# Patient Record
Sex: MALE | Race: OTHER | NOT HISPANIC OR LATINO | URBAN - METROPOLITAN AREA
[De-identification: names, ages, dates, MRNs, and addresses within clinical notes are randomized per-mention and may not be internally consistent; named-entity substitution may affect disease eponyms.]

---

## 2022-01-12 VITALS
TEMPERATURE: 98 F | DIASTOLIC BLOOD PRESSURE: 88 MMHG | SYSTOLIC BLOOD PRESSURE: 134 MMHG | RESPIRATION RATE: 18 BRPM | OXYGEN SATURATION: 98 % | HEART RATE: 98 BPM

## 2022-01-12 RX ORDER — EVOLOCUMAB 140 MG/ML
1 INJECTION, SOLUTION SUBCUTANEOUS
Qty: 0 | Refills: 0 | DISCHARGE

## 2022-01-12 RX ORDER — METOPROLOL TARTRATE 50 MG
1 TABLET ORAL
Qty: 0 | Refills: 0 | DISCHARGE

## 2022-01-12 RX ORDER — PANTOPRAZOLE SODIUM 20 MG/1
1 TABLET, DELAYED RELEASE ORAL
Qty: 0 | Refills: 0 | DISCHARGE

## 2022-01-12 RX ORDER — CHLORHEXIDINE GLUCONATE 213 G/1000ML
1 SOLUTION TOPICAL ONCE
Refills: 0 | Status: DISCONTINUED | OUTPATIENT
Start: 2022-01-14 | End: 2022-01-15

## 2022-01-12 NOTE — H&P ADULT - HISTORY OF PRESENT ILLNESS
[place]   COVID:  Cardiologist: Dr Hampton  Pharmacy:   Escort:  61 Y M smoker with PMH PAD s/p multiple PTAs (Xarelto 2.5 mg last dose______), CAD with SHANNEN X2 LAD 5 yrs ago, who presented to his cardiologist c/o CP with ____ flights/blocks x ________.  Pt denies SOB, orthopnea, PND, LE edema, N/V/D, dizziness, syncope, fever, chills, melena, hematuria, sick contact or recent travel.   ECHO 10/1/21: EF 68%, mild-mod TR.     In light of risk factors, CCS angina class ___ symptoms and abnormal ____ pt presents for cardiac angiogram with possible intervention if clinically indicated.    COVID: 1/12 @ [facility]  Cardiologist: Dr Hampton  Pharmacy:  [facility] (pt to bring bottles)   Escort: wife   61 Y M smoker and daily ETOH user (1 L wine) with PMH COPD (no home O2), GERD, PAD s/p multiple PTAs (Xarelto 2.5 mg last dose 1/12/22), HTN, HLD, CAD with SHANNEN X2 LAD 5 yrs ago ([facility]), who presented to his cardiologist c/o intermittent 7/10 sscp with associated nausea and diaphoresis at rest x few months. Pt denies SOB, orthopnea, PND, LE edema, V/D, dizziness, syncope, fever, chills, melena, hematuria, sick contact or recent travel.   ECHO 10/1/21: EF 68%, mild-mod TR.  In light of risk factors, CCS angina class IV symptoms  pt presents for cardiac angiogram with possible intervention if clinically indicated.    COVID: 1/12 @ [facility]  Cardiologist: Dr Hampton  Pharmacy:  [facility] (pt to bring bottles)   Escort: wife   62 y/o Male active heavy smoker (3ppd x40yrs) and ETOH abuser (1.5L wine daily, last drink yesterday, denies withdrawal sx) with PMHx COPD (no home O2), GERD, PAD s/p multiple PTAs (Xarelto 2.5 mg last dose 1/12/22), HTN, HLD, CAD with SHANNEN x2 LAD 5 yrs ago ([facility]), who presented to Cardiologist Dr. Eid c/o intermittent 7/10 sscp with associated nausea and diaphoresis at rest x few months. Pt denies SOB, orthopnea, PND, LE edema, V/D, dizziness, syncope, fever, chills, melena, hematuria, sick contact or recent travel. ECHO 10/1/21: EF 68%, mild-mod TR.   In light of risk factors, CCS angina class IV symptoms  pt presents for cardiac angiogram with possible intervention if clinically indicated.

## 2022-01-12 NOTE — H&P ADULT - BP NONINVASIVE MEAN (MM HG)
Patient extubated per Dr. Cortes orders. SHAYLEE Dick and Dr. Cortes at bedside. Patient extubated to 6L Nasal cannula. Patient encouraged to keep coughing up secretions, and has yankeur in hand to handle secretions.    103

## 2022-01-12 NOTE — H&P ADULT - ASSESSMENT
62 y/o Male active heavy smoker (3ppd x40yrs) and ETOH abuser (1.5L wine daily, last drink yesterday, denies withdrawal sx) with PMHx COPD (no home O2), GERD, PAD s/p multiple PTAs (Xarelto 2.5 mg last dose 1/12/22), HTN, HLD, CAD with SHANNEN x2 LAD 5 yrs ago ([facility]), who in light of risk factors, CCS angina class IV symptoms  pt presents for cardiac angiogram with possible intervention if clinically indicated.    EKG: NSR @88bpm w/ TWI in leads II and aVF.  ASA 3			Mallampati class: 2         Anginal Class: 4    -No Known Allergies  -H/H = 16.4/46.9. Pt denies BRBPR, hematuria, hematochezia, melena. Pt reports good compliance w/ home ASA 81mg QD, stating his last dose was 1/11/22. Pt loaded w/ ASA 324mg x1 and Plavix 600mg x1.  -BUN/Cr = 5/0.63. EF 68%. Euvolemic on exam. IV NS @ 180cc/hr x 2hrs started pre procedure    -CIWA protocol ordered    Sedation Plan:   Moderate  Patient Is Suitable Candidate For Sedation?     Yes    Risks & benefits of procedure and alternative therapy have been explained to the patient including but not limited to: allergic reaction, bleeding with possible need for blood transfusion, infection, renal and vascular compromise, limb damage, arrhythmia, stroke, vessel dissection/perforation, myocardial infarction, and emergent CABG. Informed consent obtained at bedside and included in chart. 60 y/o Male active heavy smoker (3ppd x40yrs) and ETOH abuser (1.5L wine daily, last drink yesterday, denies withdrawal sx) with PMHx COPD (no home O2), GERD, PAD s/p multiple PTAs (Xarelto 2.5 mg last dose 1/12/22), HTN, HLD, CAD with SHANNEN x2 LAD 5 yrs ago ([facility]), who in light of risk factors, CCS angina class IV symptoms  pt presents for cardiac angiogram with possible intervention if clinically indicated.    EKG: NSR @88bpm w/ TWI in leads II and aVF.  ASA 3			Mallampati class: 2         Anginal Class: 4    -No Known Allergies  -H/H = 16.4/46.9. Pt denies BRBPR, hematuria, hematochezia, melena. Pt reports good compliance w/ home ASA 81mg QD, stating his last dose was 1/11/22. Pt's last dose of Xarelto 2.5mg was also 1/11/22. Pt loaded w/ ASA 324mg x1 and Plavix 600mg x1.  -BUN/Cr = 5/0.63. EF 68%. Euvolemic on exam. IV NS @ 180cc/hr x 2hrs started pre procedure    -CIWA protocol ordered    Sedation Plan:   Moderate  Patient Is Suitable Candidate For Sedation?     Yes    Risks & benefits of procedure and alternative therapy have been explained to the patient including but not limited to: allergic reaction, bleeding with possible need for blood transfusion, infection, renal and vascular compromise, limb damage, arrhythmia, stroke, vessel dissection/perforation, myocardial infarction, and emergent CABG. Informed consent obtained at bedside and included in chart. 60 y/o Male active heavy smoker (3ppd x40yrs) and ETOH abuser (1.5L wine daily, last drink yesterday, denies withdrawal sx) with PMHx COPD (no home O2), GERD, PAD s/p multiple PTAs (Xarelto 2.5 mg last dose 1/12/22), HTN, HLD, CAD with SHANNEN x2 LAD 5 yrs ago ([facility]), who in light of risk factors, CCS angina class IV symptoms  pt presents for cardiac angiogram with possible intervention if clinically indicated.    EKG: NSR @88bpm w/ TWI in leads II and aVF.  ASA 3			Mallampati class: 2         Anginal Class: 4    -No Known Allergies  -H/H = 16.4/46.9. Pt denies BRBPR, hematuria, hematochezia, melena. Pt reports good compliance w/ home ASA 81mg QD, stating his last dose was 1/11/22. Pt's last dose of Xarelto 2.5mg was also 1/11/22. Pt loaded w/ ASA 324mg x1 and Plavix 600mg x1.  -BUN/Cr = 5/0.63. EF 68%. Euvolemic on exam. IV NS @ 180cc/hr x 2hrs started pre procedure.    -[facility] protocol ordered    Sedation Plan:   Moderate  Patient Is Suitable Candidate For Sedation?     Yes    Risks & benefits of procedure and alternative therapy have been explained to the patient including but not limited to: allergic reaction, bleeding with possible need for blood transfusion, infection, renal and vascular compromise, limb damage, arrhythmia, stroke, vessel dissection/perforation, myocardial infarction, and emergent CABG. Informed consent obtained at bedside and included in chart.

## 2022-01-12 NOTE — H&P ADULT - NSICDXFAMILYHX_GEN_ALL_CORE_FT
FAMILY HISTORY:  Father  Still living? Unknown  FH: HTN (hypertension), Age at diagnosis: Age Unknown  FH: hyperlipidemia, Age at diagnosis: Age Unknown    Mother  Still living? Unknown  Family history of early CAD, Age at diagnosis: Age Unknown  FH: HTN (hypertension), Age at diagnosis: Age Unknown  FH: hyperlipidemia, Age at diagnosis: Age Unknown  FH: type 2 diabetes mellitus, Age at diagnosis: Age Unknown

## 2022-01-12 NOTE — H&P ADULT - NSHPLABSRESULTS_GEN_ALL_CORE
16.4   7.06  )-----------( 321      ( 14 Jan 2022 12:52 )             46.9   01-14    140  |  103  |  5<L>  ----------------------------<  89  4.2   |  25  |  0.63    Ca    9.8      14 Jan 2022 12:52    TPro  6.8  /  Alb  4.0  /  TBili  0.3  /  DBili  x   /  AST  30  /  ALT  14  /  AlkPhos  87  01-14    PT/INR - ( 14 Jan 2022 12:52 )   PT: 10.4 sec;   INR: 0.86          PTT - ( 14 Jan 2022 12:52 )  PTT:34.0 sec

## 2022-01-12 NOTE — H&P ADULT - NSHPSOCIALHISTORY_GEN_ALL_CORE
Tobacco: ___ ppd x _____ yrs  ETOH:   Drug use: Tobacco: 2 ppd x 40 yrs  ETOH: 1 L wine/night  Drug use: denies Tobacco: 2-3 ppd x 40 yrs (120 pack year hx)  ETOH: 1.5 L wine/night  Drug use: denies

## 2022-01-14 ENCOUNTER — INPATIENT (INPATIENT)
Facility: HOSPITAL | Age: 62
LOS: 0 days | Discharge: HOME | DRG: 249 | End: 2022-01-15
Attending: INTERNAL MEDICINE | Admitting: INTERNAL MEDICINE
Payer: COMMERCIAL

## 2022-01-14 DIAGNOSIS — Z98.49 CATARACT EXTRACTION STATUS, UNSPECIFIED EYE: Chronic | ICD-10-CM

## 2022-01-14 LAB
A1C WITH ESTIMATED AVERAGE GLUCOSE RESULT: 4.7 % — SIGNIFICANT CHANGE UP (ref 4–5.6)
ALBUMIN SERPL ELPH-MCNC: 4 G/DL — SIGNIFICANT CHANGE UP (ref 3.3–5)
ALP SERPL-CCNC: 87 U/L — SIGNIFICANT CHANGE UP (ref 40–120)
ALT FLD-CCNC: 14 U/L — SIGNIFICANT CHANGE UP (ref 10–45)
ANION GAP SERPL CALC-SCNC: 12 MMOL/L — SIGNIFICANT CHANGE UP (ref 5–17)
APTT BLD: 34 SEC — SIGNIFICANT CHANGE UP (ref 27.5–35.5)
AST SERPL-CCNC: 30 U/L — SIGNIFICANT CHANGE UP (ref 10–40)
BASOPHILS # BLD AUTO: 0.06 K/UL — SIGNIFICANT CHANGE UP (ref 0–0.2)
BASOPHILS NFR BLD AUTO: 0.8 % — SIGNIFICANT CHANGE UP (ref 0–2)
BILIRUB SERPL-MCNC: 0.3 MG/DL — SIGNIFICANT CHANGE UP (ref 0.2–1.2)
BUN SERPL-MCNC: 5 MG/DL — LOW (ref 7–23)
CALCIUM SERPL-MCNC: 9.8 MG/DL — SIGNIFICANT CHANGE UP (ref 8.4–10.5)
CHLORIDE SERPL-SCNC: 103 MMOL/L — SIGNIFICANT CHANGE UP (ref 96–108)
CHOLEST SERPL-MCNC: 116 MG/DL — SIGNIFICANT CHANGE UP
CK MB CFR SERPL CALC: 1.7 NG/ML — SIGNIFICANT CHANGE UP (ref 0–6.7)
CK SERPL-CCNC: 32 U/L — SIGNIFICANT CHANGE UP (ref 30–200)
CO2 SERPL-SCNC: 25 MMOL/L — SIGNIFICANT CHANGE UP (ref 22–31)
CREAT SERPL-MCNC: 0.63 MG/DL — SIGNIFICANT CHANGE UP (ref 0.5–1.3)
EOSINOPHIL # BLD AUTO: 0.21 K/UL — SIGNIFICANT CHANGE UP (ref 0–0.5)
EOSINOPHIL NFR BLD AUTO: 3 % — SIGNIFICANT CHANGE UP (ref 0–6)
ESTIMATED AVERAGE GLUCOSE: 88 MG/DL — SIGNIFICANT CHANGE UP (ref 68–114)
GLUCOSE BLDC GLUCOMTR-MCNC: 174 MG/DL — HIGH (ref 70–99)
GLUCOSE BLDC GLUCOMTR-MCNC: 85 MG/DL — SIGNIFICANT CHANGE UP (ref 70–99)
GLUCOSE SERPL-MCNC: 89 MG/DL — SIGNIFICANT CHANGE UP (ref 70–99)
HCT VFR BLD CALC: 46.9 % — SIGNIFICANT CHANGE UP (ref 39–50)
HDLC SERPL-MCNC: 43 MG/DL — SIGNIFICANT CHANGE UP
HGB BLD-MCNC: 16.4 G/DL — SIGNIFICANT CHANGE UP (ref 13–17)
IMM GRANULOCYTES NFR BLD AUTO: 0.8 % — SIGNIFICANT CHANGE UP (ref 0–1.5)
INR BLD: 0.86 — LOW (ref 0.88–1.16)
LIPID PNL WITH DIRECT LDL SERPL: 31 MG/DL — SIGNIFICANT CHANGE UP
LYMPHOCYTES # BLD AUTO: 2.28 K/UL — SIGNIFICANT CHANGE UP (ref 1–3.3)
LYMPHOCYTES # BLD AUTO: 32.3 % — SIGNIFICANT CHANGE UP (ref 13–44)
MCHC RBC-ENTMCNC: 35 GM/DL — SIGNIFICANT CHANGE UP (ref 32–36)
MCHC RBC-ENTMCNC: 35.7 PG — HIGH (ref 27–34)
MCV RBC AUTO: 102.2 FL — HIGH (ref 80–100)
MONOCYTES # BLD AUTO: 0.82 K/UL — SIGNIFICANT CHANGE UP (ref 0–0.9)
MONOCYTES NFR BLD AUTO: 11.6 % — SIGNIFICANT CHANGE UP (ref 2–14)
NEUTROPHILS # BLD AUTO: 3.63 K/UL — SIGNIFICANT CHANGE UP (ref 1.8–7.4)
NEUTROPHILS NFR BLD AUTO: 51.5 % — SIGNIFICANT CHANGE UP (ref 43–77)
NON HDL CHOLESTEROL: 73 MG/DL — SIGNIFICANT CHANGE UP
NRBC # BLD: 0 /100 WBCS — SIGNIFICANT CHANGE UP (ref 0–0)
PLATELET # BLD AUTO: 321 K/UL — SIGNIFICANT CHANGE UP (ref 150–400)
POTASSIUM SERPL-MCNC: 4.2 MMOL/L — SIGNIFICANT CHANGE UP (ref 3.5–5.3)
POTASSIUM SERPL-SCNC: 4.2 MMOL/L — SIGNIFICANT CHANGE UP (ref 3.5–5.3)
PROT SERPL-MCNC: 6.8 G/DL — SIGNIFICANT CHANGE UP (ref 6–8.3)
PROTHROM AB SERPL-ACNC: 10.4 SEC — LOW (ref 10.6–13.6)
RBC # BLD: 4.59 M/UL — SIGNIFICANT CHANGE UP (ref 4.2–5.8)
RBC # FLD: 13.2 % — SIGNIFICANT CHANGE UP (ref 10.3–14.5)
SODIUM SERPL-SCNC: 140 MMOL/L — SIGNIFICANT CHANGE UP (ref 135–145)
TRIGL SERPL-MCNC: 210 MG/DL — HIGH
WBC # BLD: 7.06 K/UL — SIGNIFICANT CHANGE UP (ref 3.8–10.5)
WBC # FLD AUTO: 7.06 K/UL — SIGNIFICANT CHANGE UP (ref 3.8–10.5)

## 2022-01-14 RX ORDER — SODIUM CHLORIDE 9 MG/ML
500 INJECTION INTRAMUSCULAR; INTRAVENOUS; SUBCUTANEOUS
Refills: 0 | Status: DISCONTINUED | OUTPATIENT
Start: 2022-01-14 | End: 2022-01-15

## 2022-01-14 RX ORDER — ASPIRIN/CALCIUM CARB/MAGNESIUM 324 MG
81 TABLET ORAL DAILY
Refills: 0 | Status: DISCONTINUED | OUTPATIENT
Start: 2022-01-15 | End: 2022-01-15

## 2022-01-14 RX ORDER — DEXTROSE 50 % IN WATER 50 %
25 SYRINGE (ML) INTRAVENOUS ONCE
Refills: 0 | Status: DISCONTINUED | OUTPATIENT
Start: 2022-01-14 | End: 2022-01-15

## 2022-01-14 RX ORDER — SODIUM CHLORIDE 9 MG/ML
1000 INJECTION, SOLUTION INTRAVENOUS
Refills: 0 | Status: DISCONTINUED | OUTPATIENT
Start: 2022-01-14 | End: 2022-01-14

## 2022-01-14 RX ORDER — DEXTROSE 50 % IN WATER 50 %
12.5 SYRINGE (ML) INTRAVENOUS ONCE
Refills: 0 | Status: DISCONTINUED | OUTPATIENT
Start: 2022-01-14 | End: 2022-01-15

## 2022-01-14 RX ORDER — PANTOPRAZOLE SODIUM 20 MG/1
40 TABLET, DELAYED RELEASE ORAL DAILY
Refills: 0 | Status: DISCONTINUED | OUTPATIENT
Start: 2022-01-14 | End: 2022-01-15

## 2022-01-14 RX ORDER — RIVAROXABAN 15 MG-20MG
2.5 KIT ORAL
Refills: 0 | Status: DISCONTINUED | OUTPATIENT
Start: 2022-01-15 | End: 2022-01-15

## 2022-01-14 RX ORDER — DEXTROSE 50 % IN WATER 50 %
15 SYRINGE (ML) INTRAVENOUS ONCE
Refills: 0 | Status: DISCONTINUED | OUTPATIENT
Start: 2022-01-14 | End: 2022-01-15

## 2022-01-14 RX ORDER — INSULIN LISPRO 100/ML
VIAL (ML) SUBCUTANEOUS ONCE
Refills: 0 | Status: DISCONTINUED | OUTPATIENT
Start: 2022-01-14 | End: 2022-01-14

## 2022-01-14 RX ORDER — DEXTROSE 50 % IN WATER 50 %
25 SYRINGE (ML) INTRAVENOUS ONCE
Refills: 0 | Status: DISCONTINUED | OUTPATIENT
Start: 2022-01-14 | End: 2022-01-14

## 2022-01-14 RX ORDER — CLOPIDOGREL BISULFATE 75 MG/1
75 TABLET, FILM COATED ORAL DAILY
Refills: 0 | Status: DISCONTINUED | OUTPATIENT
Start: 2022-01-15 | End: 2022-01-15

## 2022-01-14 RX ORDER — ASPIRIN/CALCIUM CARB/MAGNESIUM 324 MG
325 TABLET ORAL ONCE
Refills: 0 | Status: COMPLETED | OUTPATIENT
Start: 2022-01-14 | End: 2022-01-14

## 2022-01-14 RX ORDER — DEXTROSE 50 % IN WATER 50 %
12.5 SYRINGE (ML) INTRAVENOUS ONCE
Refills: 0 | Status: DISCONTINUED | OUTPATIENT
Start: 2022-01-14 | End: 2022-01-14

## 2022-01-14 RX ORDER — SODIUM CHLORIDE 9 MG/ML
1000 INJECTION, SOLUTION INTRAVENOUS
Refills: 0 | Status: DISCONTINUED | OUTPATIENT
Start: 2022-01-14 | End: 2022-01-15

## 2022-01-14 RX ORDER — CLOPIDOGREL BISULFATE 75 MG/1
600 TABLET, FILM COATED ORAL ONCE
Refills: 0 | Status: COMPLETED | OUTPATIENT
Start: 2022-01-14 | End: 2022-01-14

## 2022-01-14 RX ORDER — METOPROLOL TARTRATE 50 MG
25 TABLET ORAL DAILY
Refills: 0 | Status: DISCONTINUED | OUTPATIENT
Start: 2022-01-15 | End: 2022-01-15

## 2022-01-14 RX ORDER — SODIUM CHLORIDE 9 MG/ML
500 INJECTION INTRAMUSCULAR; INTRAVENOUS; SUBCUTANEOUS
Refills: 0 | Status: DISCONTINUED | OUTPATIENT
Start: 2022-01-14 | End: 2022-01-14

## 2022-01-14 RX ORDER — GLUCAGON INJECTION, SOLUTION 0.5 MG/.1ML
1 INJECTION, SOLUTION SUBCUTANEOUS ONCE
Refills: 0 | Status: DISCONTINUED | OUTPATIENT
Start: 2022-01-14 | End: 2022-01-14

## 2022-01-14 RX ORDER — DEXTROSE 50 % IN WATER 50 %
15 SYRINGE (ML) INTRAVENOUS ONCE
Refills: 0 | Status: DISCONTINUED | OUTPATIENT
Start: 2022-01-14 | End: 2022-01-14

## 2022-01-14 RX ORDER — GLUCAGON INJECTION, SOLUTION 0.5 MG/.1ML
1 INJECTION, SOLUTION SUBCUTANEOUS ONCE
Refills: 0 | Status: DISCONTINUED | OUTPATIENT
Start: 2022-01-14 | End: 2022-01-15

## 2022-01-14 RX ORDER — INSULIN LISPRO 100/ML
VIAL (ML) SUBCUTANEOUS
Refills: 0 | Status: DISCONTINUED | OUTPATIENT
Start: 2022-01-14 | End: 2022-01-15

## 2022-01-14 RX ORDER — INFLUENZA VIRUS VACCINE 15; 15; 15; 15 UG/.5ML; UG/.5ML; UG/.5ML; UG/.5ML
0.5 SUSPENSION INTRAMUSCULAR ONCE
Refills: 0 | Status: DISCONTINUED | OUTPATIENT
Start: 2022-01-14 | End: 2022-01-15

## 2022-01-14 RX ADMIN — SODIUM CHLORIDE 180 MILLILITER(S): 9 INJECTION INTRAMUSCULAR; INTRAVENOUS; SUBCUTANEOUS at 19:27

## 2022-01-14 RX ADMIN — Medication 2: at 22:32

## 2022-01-14 RX ADMIN — CLOPIDOGREL BISULFATE 600 MILLIGRAM(S): 75 TABLET, FILM COATED ORAL at 14:34

## 2022-01-14 RX ADMIN — Medication 325 MILLIGRAM(S): at 14:34

## 2022-01-14 RX ADMIN — SODIUM CHLORIDE 180 MILLILITER(S): 9 INJECTION INTRAMUSCULAR; INTRAVENOUS; SUBCUTANEOUS at 14:34

## 2022-01-14 NOTE — PATIENT PROFILE ADULT - FALL HARM RISK - HARM RISK INTERVENTIONS

## 2022-01-14 NOTE — PROGRESS NOTE ADULT - SUBJECTIVE AND OBJECTIVE BOX
Interventional Cardiology Radial band Removal Note    Pt without complaints.  VSS.    Right Radial access site TR Hemoband in place, no hematoma, no bleed  Radial pulse: 2+    Hemostasis achieved with manual release of hemoband.    No Vasovagal reaction.    Meds given: None    Right Radial access site  no hematoma, no bleed  Radial pulse: 2+    A/P:  Stent  -	continue to monitor  -	-OOB as tolerated  -	Post Procedure Instructions given  -                   Call [phone] if any access site issues.

## 2022-01-15 VITALS — TEMPERATURE: 98 F

## 2022-01-15 LAB
ANION GAP SERPL CALC-SCNC: 9 MMOL/L — SIGNIFICANT CHANGE UP (ref 5–17)
BUN SERPL-MCNC: 6 MG/DL — LOW (ref 7–23)
CALCIUM SERPL-MCNC: 8.8 MG/DL — SIGNIFICANT CHANGE UP (ref 8.4–10.5)
CHLORIDE SERPL-SCNC: 106 MMOL/L — SIGNIFICANT CHANGE UP (ref 96–108)
CO2 SERPL-SCNC: 27 MMOL/L — SIGNIFICANT CHANGE UP (ref 22–31)
CREAT SERPL-MCNC: 0.67 MG/DL — SIGNIFICANT CHANGE UP (ref 0.5–1.3)
GLUCOSE BLDC GLUCOMTR-MCNC: 105 MG/DL — HIGH (ref 70–99)
GLUCOSE SERPL-MCNC: 122 MG/DL — HIGH (ref 70–99)
HCT VFR BLD CALC: 42.7 % — SIGNIFICANT CHANGE UP (ref 39–50)
HCV AB S/CO SERPL IA: 0.03 S/CO — SIGNIFICANT CHANGE UP
HCV AB SERPL-IMP: SIGNIFICANT CHANGE UP
HGB BLD-MCNC: 14.3 G/DL — SIGNIFICANT CHANGE UP (ref 13–17)
MAGNESIUM SERPL-MCNC: 1.5 MG/DL — LOW (ref 1.6–2.6)
MCHC RBC-ENTMCNC: 33.5 GM/DL — SIGNIFICANT CHANGE UP (ref 32–36)
MCHC RBC-ENTMCNC: 34.5 PG — HIGH (ref 27–34)
MCV RBC AUTO: 103.1 FL — HIGH (ref 80–100)
NRBC # BLD: 0 /100 WBCS — SIGNIFICANT CHANGE UP (ref 0–0)
PLATELET # BLD AUTO: 289 K/UL — SIGNIFICANT CHANGE UP (ref 150–400)
POTASSIUM SERPL-MCNC: 3.9 MMOL/L — SIGNIFICANT CHANGE UP (ref 3.5–5.3)
POTASSIUM SERPL-SCNC: 3.9 MMOL/L — SIGNIFICANT CHANGE UP (ref 3.5–5.3)
RBC # BLD: 4.14 M/UL — LOW (ref 4.2–5.8)
RBC # FLD: 13.2 % — SIGNIFICANT CHANGE UP (ref 10.3–14.5)
SODIUM SERPL-SCNC: 142 MMOL/L — SIGNIFICANT CHANGE UP (ref 135–145)
WBC # BLD: 6.18 K/UL — SIGNIFICANT CHANGE UP (ref 3.8–10.5)
WBC # FLD AUTO: 6.18 K/UL — SIGNIFICANT CHANGE UP (ref 3.8–10.5)

## 2022-01-15 PROCEDURE — 82962 GLUCOSE BLOOD TEST: CPT

## 2022-01-15 PROCEDURE — C1753: CPT

## 2022-01-15 PROCEDURE — 86803 HEPATITIS C AB TEST: CPT

## 2022-01-15 PROCEDURE — 82550 ASSAY OF CK (CPK): CPT

## 2022-01-15 PROCEDURE — 36415 COLL VENOUS BLD VENIPUNCTURE: CPT

## 2022-01-15 PROCEDURE — 80048 BASIC METABOLIC PNL TOTAL CA: CPT

## 2022-01-15 PROCEDURE — C1887: CPT

## 2022-01-15 PROCEDURE — C1874: CPT

## 2022-01-15 PROCEDURE — 80061 LIPID PANEL: CPT

## 2022-01-15 PROCEDURE — 83735 ASSAY OF MAGNESIUM: CPT

## 2022-01-15 PROCEDURE — 82553 CREATINE MB FRACTION: CPT

## 2022-01-15 PROCEDURE — C1725: CPT

## 2022-01-15 PROCEDURE — 85610 PROTHROMBIN TIME: CPT

## 2022-01-15 PROCEDURE — 85027 COMPLETE CBC AUTOMATED: CPT

## 2022-01-15 PROCEDURE — 80053 COMPREHEN METABOLIC PANEL: CPT

## 2022-01-15 PROCEDURE — C1894: CPT

## 2022-01-15 PROCEDURE — 83036 HEMOGLOBIN GLYCOSYLATED A1C: CPT

## 2022-01-15 PROCEDURE — 85025 COMPLETE CBC W/AUTO DIFF WBC: CPT

## 2022-01-15 PROCEDURE — 99239 HOSP IP/OBS DSCHRG MGMT >30: CPT

## 2022-01-15 PROCEDURE — C1769: CPT

## 2022-01-15 PROCEDURE — 85730 THROMBOPLASTIN TIME PARTIAL: CPT

## 2022-01-15 RX ORDER — CLOPIDOGREL BISULFATE 75 MG/1
1 TABLET, FILM COATED ORAL
Qty: 30 | Refills: 7
Start: 2022-01-15 | End: 2022-09-11

## 2022-01-15 RX ORDER — RIVAROXABAN 15 MG-20MG
1 KIT ORAL
Qty: 0 | Refills: 0 | DISCHARGE

## 2022-01-15 RX ORDER — MAGNESIUM SULFATE 500 MG/ML
2 VIAL (ML) INJECTION ONCE
Refills: 0 | Status: COMPLETED | OUTPATIENT
Start: 2022-01-15 | End: 2022-01-15

## 2022-01-15 RX ORDER — ASPIRIN/CALCIUM CARB/MAGNESIUM 324 MG
1 TABLET ORAL
Qty: 0 | Refills: 0 | DISCHARGE

## 2022-01-15 RX ORDER — ACETAMINOPHEN 500 MG
650 TABLET ORAL ONCE
Refills: 0 | Status: COMPLETED | OUTPATIENT
Start: 2022-01-15 | End: 2022-01-15

## 2022-01-15 RX ORDER — ASPIRIN/CALCIUM CARB/MAGNESIUM 324 MG
1 TABLET ORAL
Qty: 30 | Refills: 6
Start: 2022-01-15 | End: 2022-08-12

## 2022-01-15 RX ADMIN — RIVAROXABAN 2.5 MILLIGRAM(S): KIT at 06:53

## 2022-01-15 RX ADMIN — Medication 81 MILLIGRAM(S): at 12:37

## 2022-01-15 RX ADMIN — Medication 25 GRAM(S): at 10:39

## 2022-01-15 RX ADMIN — PANTOPRAZOLE SODIUM 40 MILLIGRAM(S): 20 TABLET, DELAYED RELEASE ORAL at 12:36

## 2022-01-15 RX ADMIN — CLOPIDOGREL BISULFATE 75 MILLIGRAM(S): 75 TABLET, FILM COATED ORAL at 12:37

## 2022-01-15 RX ADMIN — Medication 650 MILLIGRAM(S): at 01:00

## 2022-01-15 RX ADMIN — Medication 25 MILLIGRAM(S): at 06:53

## 2022-01-15 RX ADMIN — Medication 650 MILLIGRAM(S): at 00:25

## 2022-01-15 NOTE — DISCHARGE NOTE NURSING/CASE MANAGEMENT/SOCIAL WORK - PATIENT PORTAL LINK FT
You can access the FollowMyHealth Patient Portal offered by [facility] by registering at the following website: [email or web address]. By joining [facility]’s FollowMyHealth portal, you will also be able to view your health information using other applications (apps) compatible with our system.

## 2022-01-15 NOTE — DISCHARGE NOTE NURSING/CASE MANAGEMENT/SOCIAL WORK - BRAND OF COVID-19 VACCINATION
pt stated 2 doses of moderna and second dose was April 2021 but does not have card and does not know exact date.  Pt refuses booster at this time and will follow up with PMD and cardiologist after discharge/Moderna dose 1 and 2

## 2022-01-15 NOTE — DISCHARGE NOTE PROVIDER - NSDCFUADDINST_GEN_ALL_CORE_FT
- Aspirin and Plavix can put you at increased risk of bleeding; please avoid NSAIDS (such as Motrin, Advil, Ibuprofen, Naproxen, or Aleve, as these medications may further your risk of bleeding  - We have provided you with a prescription for cardiac rehab which is medically supervised exercise program for your heart and has been shown to improve the quantity and quality of life of people with heart disease like yours. You should attend cardiac rehab 3 times per week for 12 weeks. We have provided you with a list of nearby facilities. Please call your insurance carrier to determine which of these facilities are covered under your plan. Please bring this prescription with you to your follow up appointment with your cardiologist who can then further assist you to enroll into a cardiac rehab program.

## 2022-01-15 NOTE — DISCHARGE NOTE PROVIDER - NSDCCPCAREPLAN_GEN_ALL_CORE_FT
PRINCIPAL DISCHARGE DIAGNOSIS  Diagnosis: Coronary disease  Assessment and Plan of Treatment: - You have a known history of coronary artery disease with stents in the past. You came to the hospital for a planned cardiac cath and received one STENT to the left anterior descending artery.   - You are to STOP Xarelto 2.5mg twice a day   - NEVER MISS A DOSE OF ASPIRIN OR PLAVIX. IF YOU DO, YOU ARE AT RISK OF YOUR STENTS CLOSING AND HAVING A HEART ATTACK. DO NOT STOP THESE TWO MEDICATIONS UNLESS INSTRUCTED TO DO SO BY YOUR CARDIOLOGIST.   - Do NOT drive or operate hazardous machinery for 24 hours. Limit your physical activity for 24-48 hours. Do NOT engage in sports, heavy work or heavy lifting for 72 hours.   - You MAY shower BUT no TUB BATHS, HOT TUBS OR SWIMMING FOR 5 DAYS  - Your procedure was done through your right wrist. If you observe flank bleeding from the puncture site, it is an emergency. Please put direct pressure on the site and go directly to the ER. Bleeding under the skin may also occur and a small "black and blue" may be expected. If the area appears to be expanding or swelling around the puncture site, apply manual compression and go immediately to the nearest ER. If your arm/hand becomes cool or blue and/or you are unable to move it, this must be treated as an emergency, go directly to the nearest ER. Look for signs of infection in the wrist: fever, red streaking of the arm, obvious pus formation and pain.      SECONDARY DISCHARGE DIAGNOSES  Diagnosis: Has been smoking tobacco for 30 years or more  Assessment and Plan of Treatment:   - As discussed it is important to STOP drinking AND smoking.  This can dramatically improve your health and help you live longer. It lowers your risk of heart disease, lung disease, kidney failure, infection, and cancer.

## 2022-01-15 NOTE — DISCHARGE NOTE NURSING/CASE MANAGEMENT/SOCIAL WORK - NSDCPEFALRISK_GEN_ALL_CORE
For information on Fall & Injury Prevention, visit: [email or web address] OR  [email or web address] OR  https://www.cdc.gov/steadi/patient.html

## 2022-01-15 NOTE — DISCHARGE NOTE PROVIDER - CARE PROVIDER_API CALL
Omid Hampton (MD)  Cardiovascular Disease; Interventional Cardiology  [address] [facility], [address] [place] [address]  Phone: [phone]  Fax: [phone]  Follow Up Time: 2 weeks

## 2022-01-15 NOTE — DISCHARGE NOTE PROVIDER - HOSPITAL COURSE
62 y/o Male active heavy smoker (3ppd x40yrs) and ETOH abuser (1.5L wine daily, last drink yesterday, denies withdrawal sx) with PMHx COPD (no home O2), GERD, PAD s/p multiple PTAs (Xarelto 2.5 mg last dose 1/12/22), HTN, HLD, CAD with SHANNEN x2 LAD 5 yrs ago ([facility]), initially presented to cardiologist, Dr. Hampton c/o intermitted CP a/w n/v/diaphoresis x few months. ECHO 10/1/21: EF 68%, mild-mod TR In light of risk factors, CCS angina class IV symptoms  pt presents for cardiac angiogram with possible intervention if clinically indicated.  Patient now s/p cardiac cath 1/14/22: SHANNEN pLAD; patent mLAD/OM1, 40-50 % mRCA, EDP 3, EF 68 % by ECHO. Pt seen and examined at bedside this morning. Pt comfortable, denies any CP, SOB, dizziness, or palpitations. VSS, right radial access sites stable, no bleeding or hematoma noted, pulse 2 + b/l. AM labs stable.  Patient has been given appropriate discharge instructions including medication regimen, access site management and follow up. Prescriptions have been e-prescribed to patient's preferred pharmacy.     Cardiac Rehab (STEMI/NSTEMI/ACS/Unstable Angina/CHF/post-PCI):            *Education on benefits of Cardiac Rehab provided to patient: Yes         *Referral and Prescription Given for Cardiac Rehab : Yes         *Pt given list of locations & instructed to contact their insurance company to review list of participating providers    DC Meds:      62 y/o Male active heavy smoker (3ppd x40yrs) and ETOH abuser (1.5L wine daily, last drink yesterday, denies withdrawal sx) with PMHx COPD (no home O2), GERD, PAD s/p multiple PTAs (Xarelto 2.5 mg last dose 1/12/22), HTN, HLD, CAD with SHANNEN x2 LAD 5 yrs ago ([facility]), initially presented to cardiologist, Dr. Hampton c/o intermitted CP a/w n/v/diaphoresis x few months. ECHO 10/1/21: EF 68%, mild-mod TR In light of risk factors, CCS angina class IV symptoms  pt presents for cardiac angiogram with possible intervention if clinically indicated.  Patient now s/p cardiac cath 1/14/22: SHANNEN pLAD; patent mLAD/OM1, 40-50 % mRCA, EDP 3, EF 68 % by ECHO. Pt seen and examined at bedside this morning. Pt comfortable, denies any CP, SOB, dizziness, or palpitations. VSS, right radial access sites stable, no bleeding or hematoma noted, pulse 2 + b/l. AM labs stable.  Patient has been given appropriate discharge instructions including medication regimen, access site management and follow up. Prescriptions have been e-prescribed to patient's preferred pharmacy.     Cardiac Rehab (STEMI/NSTEMI/ACS/Unstable Angina/CHF/post-PCI):            *Education on benefits of Cardiac Rehab provided to patient: Yes         *Referral and Prescription Given for Cardiac Rehab : Yes         *Pt given list of locations & instructed to contact their insurance company to review list of participating providers    DC Meds:     ASA 81mg  Plavix 75mg  Toprol XL 25mg  Repatha

## 2022-01-20 DIAGNOSIS — Z79.82 LONG TERM (CURRENT) USE OF ASPIRIN: ICD-10-CM

## 2022-01-20 DIAGNOSIS — F10.10 ALCOHOL ABUSE, UNCOMPLICATED: ICD-10-CM

## 2022-01-20 DIAGNOSIS — I73.9 PERIPHERAL VASCULAR DISEASE, UNSPECIFIED: ICD-10-CM

## 2022-01-20 DIAGNOSIS — J44.9 CHRONIC OBSTRUCTIVE PULMONARY DISEASE, UNSPECIFIED: ICD-10-CM

## 2022-01-20 DIAGNOSIS — I25.10 ATHEROSCLEROTIC HEART DISEASE OF NATIVE CORONARY ARTERY WITHOUT ANGINA PECTORIS: ICD-10-CM

## 2022-01-20 DIAGNOSIS — K21.9 GASTRO-ESOPHAGEAL REFLUX DISEASE WITHOUT ESOPHAGITIS: ICD-10-CM
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
## 2022-01-20 DIAGNOSIS — T82.857A STENOSIS OF OTHER CARDIAC PROSTHETIC DEVICES, IMPLANTS AND GRAFTS, INITIAL ENCOUNTER: ICD-10-CM

## 2022-01-20 DIAGNOSIS — Z79.02 LONG TERM (CURRENT) USE OF ANTITHROMBOTICS/ANTIPLATELETS: ICD-10-CM

## 2022-01-20 DIAGNOSIS — E78.5 HYPERLIPIDEMIA, UNSPECIFIED: ICD-10-CM

## 2022-01-20 DIAGNOSIS — R07.9 CHEST PAIN, UNSPECIFIED: ICD-10-CM

## 2022-03-29 NOTE — DISCHARGE NOTE PROVIDER - NSDCMRMEDTOKEN_GEN_ALL_CORE_FT
increase fluids, tylenol for fever, motrin for pain. Humidifier to help with congestion and to sleep at night. Dicussed OTC meds, gargle with warm salt water. If there is recurrent fever, shortness of breath, lethargy, advised to come in to the office or go to the ER.     Aspirin Enteric Coated 81 mg oral delayed release tablet: 1 tab(s) orally once a day  pantoprazole 40 mg oral delayed release tablet: 1 tab(s) orally once a day  Repatha SureClick 140 mg/mL subcutaneous solution: 1 application subcutaneous every 2 weeks  Toprol-XL 25 mg oral tablet, extended release: 1 tab(s) orally once a day  Xarelto 2.5 mg oral tablet: 1 tab(s) orally 2 times a day   Aspirin Enteric Coated 81 mg oral delayed release tablet: 1 tab(s) orally once a day  Cardiac Rehab 1: Cardiac Rehab for dx CAD s/p stent placement:  3 days/week x 12 weeks     Cardiologist: Dr. Hampton   clopidogrel 75 mg oral tablet: 1 tab(s) orally once a day  pantoprazole 40 mg oral delayed release tablet: 1 tab(s) orally once a day  Repatha SureClick 140 mg/mL subcutaneous solution: 1 application subcutaneous every 2 weeks  Toprol-XL 25 mg oral tablet, extended release: 1 tab(s) orally once a day